# Patient Record
Sex: FEMALE | Race: WHITE | URBAN - METROPOLITAN AREA
[De-identification: names, ages, dates, MRNs, and addresses within clinical notes are randomized per-mention and may not be internally consistent; named-entity substitution may affect disease eponyms.]

---

## 2020-08-04 ENCOUNTER — OFFICE VISIT (OUTPATIENT)
Dept: URBAN - METROPOLITAN AREA CLINIC 115 | Facility: CLINIC | Age: 80
End: 2020-08-04
Payer: MEDICARE

## 2020-08-04 DIAGNOSIS — K63.5 COLON POLYPS: ICD-10-CM

## 2020-08-04 DIAGNOSIS — K21.9 GERD: ICD-10-CM

## 2020-08-04 DIAGNOSIS — D86.0 SARCOIDOSIS OF LUNG: ICD-10-CM

## 2020-08-04 DIAGNOSIS — K58.9 IBS (IRRITABLE BOWEL SYNDROME): ICD-10-CM

## 2020-08-04 DIAGNOSIS — J45.909 ASTHMA: ICD-10-CM

## 2020-08-04 PROBLEM — 24369008 SARCOIDOSIS OF LUNG: Status: ACTIVE | Noted: 2020-08-04

## 2020-08-04 PROCEDURE — G8420 CALC BMI NORM PARAMETERS: HCPCS | Performed by: INTERNAL MEDICINE

## 2020-08-04 PROCEDURE — 99213 OFFICE O/P EST LOW 20 MIN: CPT | Performed by: INTERNAL MEDICINE

## 2020-08-04 PROCEDURE — 1036F TOBACCO NON-USER: CPT | Performed by: INTERNAL MEDICINE

## 2020-08-04 PROCEDURE — G8427 DOCREV CUR MEDS BY ELIG CLIN: HCPCS | Performed by: INTERNAL MEDICINE

## 2020-08-04 RX ORDER — KRILL/OM-3/DHA/EPA/PHOSPHO/AST 500MG-86MG
CAPSULE ORAL
Qty: 0 | Refills: 0 | Status: ACTIVE | COMMUNITY
Start: 1900-01-01

## 2020-08-04 RX ORDER — HYOSCYAMINE SULFATE 0.12 MG/1
TAKE 1 TABLET BY ORAL ROUTE 3 TIMES A DAY AS NEEDED FOR 90 DAYS TABLET ORAL
Qty: 270 | Refills: 3 | Status: ACTIVE | COMMUNITY
Start: 2019-08-12 | End: 2020-08-06

## 2020-08-04 RX ORDER — ALBUTEROL SULFATE 90 UG/1
AEROSOL, METERED RESPIRATORY (INHALATION)
Qty: 0 | Refills: 0 | Status: ACTIVE | COMMUNITY
Start: 1900-01-01

## 2020-08-04 RX ORDER — PANTOPRAZOLE SODIUM 20 MG
TAKE ONE TABLET BY MOUTH DAILY FOR 90 DAYS TABLET, DELAYED RELEASE (ENTERIC COATED) ORAL
Qty: 90 | Refills: 3 | Status: ACTIVE | COMMUNITY
Start: 2019-08-12 | End: 2020-08-06

## 2020-08-04 NOTE — HPI-TODAY'S VISIT:
The patient is a 80 year old /White female , who presents for the follow-up evaluation of GERD and IBS.     During the patient's previous visit about a year ago, she was taking Hyoscyamine when she had diarrhea and she took Metronidazole for abdominal pains and diverticulitis,which are still of benefit.   Currently, the patient is doing well. She does not think her reflux is a factor for her shortness of breath, which is liklyl due to sarcoid.. The long term effects of PPI use were discussed. She was advised to try Zantac 150mg at bedtime. She may have trouble with diverticulosis especially if she walks after she eats. Right before a BM she can become severly nauseated, so she takes Zofran which is of benefit.   SUMMARY OF PREVIOUS VISITON 5/02/2016 Last seen in May 2015, 1 year ago, for diverticulitis and started antibiotics at that time. She reported the antibiotics were effective and she had been feeling much better since. She last had abdominal pain 1 month ago. She went on clear liquids and kept her bowels moving which relieved the pain. The pain had not been as severe as it was in May 2015. Too much fruit or heavy lettuce could cause diverticulitis. If she ate salads or spinach, she would see diarrhea for a couple days, but she took Hyoscyamine. BM QD. When she had spells of abdominal pain, she took metronidazole for a few days until she was feeling better, usually 3-4 days. If she ate spicy or eats 3 meals a day, she had reflux. She tried not to eat much in the evening. She ate a light breakfast and lunch. She took Protonix  20mg QD-BID. She occasionally woke up with a foul taste in her mouth. She denied trouble swallowing. Her last bone denisity was in 2015, she denied osteopenia or osteoporosis. Recent B12 okay, chemistries normal, cbc okay. She took Prednisone if she was wheezing. She had not taken Prednisone since November. We discussed complications of long term PPI use, and emphasized the cumulative effects of prednisonand PPIs on bone density.  4/12/18- CBC normal, CMP normal. 5/8/17- Rapid Hep C results negative    On 8/6/18, the patient stated she intermittently had reflux which was relieved with pantoprazole and Zantac. The patient also had a lot of nausea which was relieved with ondansetron. The patient intermittently took Predisone when her wheezing began because she believed it was causing her nausea. The patient also stated that after taking Prednisone, her esophagus felt irritated. She denied having trouble swallowing. She was taking a vitamin D supplemants for vit. D deficiency. She denied having taken any probiotics.  The patient stated she occasionally experienced diarrhea. She stated she had intermittent hypogastric abdominal pain. The pain was relieved after having a BM. She stated having a BM about every 3 days.    Today, the patient notes she continues to take Align QD which has been of great benefit. She is doing well with her Sarcoidosis. She takes Prednisone for a week when needed and if it worsens she goes to her pulmonologist.    She notes it feels like food sits in her epigastric area. Recently thought food might be getting stuck after she started prednisons so advised to take prevacid and pantoprazole daily as she has in the past.   Her last colonoscopy was 10 years ago so will need to schedule a screening colonoscopy. Time spent with patient: 13 min  Today, 8/4/20, patient reports she continues to take Align and is working well. She typically has a BM QD. Patient takes protonix prn. Occasional levsin but not regularly. Has wheezes off and on. Patient notes 45 years ago she was diagnosed with sarcoidosis and is currently stable. She takes Prednisone prn; she has not taken a dose since January.  Discussed she is not due for a colonoscopy until 2022 due to sessile serrated polyps she had in 2019.   Time spent with patient: 9 min

## 2020-08-04 NOTE — PHYSICAL EXAM HENT:
Head,  normocephalic,  atraumatic,  Face,  Face within normal limits,  Ears,  External ears within normal limits, Patient wearing mask due to COVID-19

## 2022-01-20 ENCOUNTER — TELEPHONE ENCOUNTER (OUTPATIENT)
Dept: URBAN - METROPOLITAN AREA SURGERY CENTER 30 | Facility: SURGERY CENTER | Age: 82
End: 2022-01-20

## 2022-01-20 RX ORDER — HYOSCYAMINE SULFATE 0.12 MG/1
TAKE 1 TABLET BY ORAL ROUTE 3 TIMES A DAY AS NEEDED FOR 90 DAYS TABLET ORAL
Qty: 90 | Refills: 2
Start: 2019-08-12 | End: 2022-04-20

## 2022-01-20 RX ORDER — PANTOPRAZOLE SODIUM 20 MG
TAKE ONE TABLET BY MOUTH DAILY FOR 90 DAYS TABLET, DELAYED RELEASE (ENTERIC COATED) ORAL ONCE A DAY
Qty: 30 | Refills: 2
Start: 2019-08-12

## 2022-04-14 ENCOUNTER — WEB ENCOUNTER (OUTPATIENT)
Dept: URBAN - METROPOLITAN AREA CLINIC 115 | Facility: CLINIC | Age: 82
End: 2022-04-14

## 2022-04-18 ENCOUNTER — LAB OUTSIDE AN ENCOUNTER (OUTPATIENT)
Dept: URBAN - METROPOLITAN AREA CLINIC 115 | Facility: CLINIC | Age: 82
End: 2022-04-18

## 2022-04-18 ENCOUNTER — OFFICE VISIT (OUTPATIENT)
Dept: URBAN - METROPOLITAN AREA CLINIC 115 | Facility: CLINIC | Age: 82
End: 2022-04-18
Payer: MEDICARE

## 2022-04-18 VITALS
BODY MASS INDEX: 26.13 KG/M2 | TEMPERATURE: 97.7 F | DIASTOLIC BLOOD PRESSURE: 71 MMHG | HEIGHT: 62 IN | SYSTOLIC BLOOD PRESSURE: 124 MMHG | WEIGHT: 142 LBS | HEART RATE: 79 BPM

## 2022-04-18 DIAGNOSIS — Z86.010 HISTORY OF COLON POLYPS: ICD-10-CM

## 2022-04-18 DIAGNOSIS — K58.9 IBS (IRRITABLE BOWEL SYNDROME): ICD-10-CM

## 2022-04-18 DIAGNOSIS — K21.9 GERD: ICD-10-CM

## 2022-04-18 DIAGNOSIS — R13.19 INTERMITTENT DYSPHAGIA: ICD-10-CM

## 2022-04-18 PROCEDURE — 99213 OFFICE O/P EST LOW 20 MIN: CPT | Performed by: INTERNAL MEDICINE

## 2022-04-18 RX ORDER — PANTOPRAZOLE SODIUM 20 MG
1 TABLET TABLET, DELAYED RELEASE (ENTERIC COATED) ORAL ONCE A DAY
Qty: 90 TABLET | Refills: 3

## 2022-04-18 RX ORDER — ALBUTEROL SULFATE 90 UG/1
AEROSOL, METERED RESPIRATORY (INHALATION)
Qty: 0 | Refills: 0 | Status: ACTIVE | COMMUNITY
Start: 1900-01-01

## 2022-04-18 RX ORDER — PANTOPRAZOLE SODIUM 20 MG
TAKE ONE TABLET BY MOUTH DAILY FOR 90 DAYS TABLET, DELAYED RELEASE (ENTERIC COATED) ORAL ONCE A DAY
Qty: 30 | Refills: 2 | Status: ACTIVE | COMMUNITY
Start: 2019-08-12

## 2022-04-18 RX ORDER — KRILL/OM-3/DHA/EPA/PHOSPHO/AST 500MG-86MG
CAPSULE ORAL
Qty: 0 | Refills: 0 | Status: ACTIVE | COMMUNITY
Start: 1900-01-01

## 2022-04-18 RX ORDER — ONDANSETRON HYDROCHLORIDE 4 MG/1
1 TABLET TABLET, FILM COATED ORAL TWICE A DAY
Qty: 180 TABLET | Refills: 3 | OUTPATIENT

## 2022-04-18 RX ORDER — CIPROFLOXACIN 500 MG/5ML
2.5 ML KIT ORAL
Status: ACTIVE | COMMUNITY

## 2022-04-18 RX ORDER — ALBUTEROL SULFATE 2.5 MG/3ML
3 ML AS NEEDED SOLUTION RESPIRATORY (INHALATION)
Status: ACTIVE | COMMUNITY

## 2022-04-18 RX ORDER — HYOSCYAMINE SULFATE 0.12 MG/1
TAKE 1 TABLET BY ORAL ROUTE 3 TIMES A DAY AS NEEDED FOR 90 DAYS TABLET ORAL THREE TIMES A DAY
Qty: 270 | Refills: 3

## 2022-04-18 RX ORDER — HYOSCYAMINE SULFATE 0.12 MG/1
TAKE 1 TABLET BY ORAL ROUTE 3 TIMES A DAY AS NEEDED FOR 90 DAYS TABLET ORAL
Qty: 90 | Refills: 2 | Status: ACTIVE | COMMUNITY
Start: 2019-08-12 | End: 2022-04-20

## 2022-04-18 NOTE — HPI-TODAY'S VISIT:
80 y/o white female presents to discuss colonoscopy for hx of polyps. Last colonoscopy 2019, had 15mm sessile serrated adenoma, 3 yr recall recommended.  Hx also notable for IBD-D, stable on probiotics with BM every other day.  Uses hyoscyamine as needed.  Also has intermittent dysphagia.   Takes PPI intermittently, when has heartburn.  Will take protonix for a few days then switch to prevacid then stop.  Uses ondansetron intermittently for nausea, usually occurs when having BM.

## 2022-06-10 PROBLEM — 428283002: Status: ACTIVE | Noted: 2022-04-18

## 2022-07-06 ENCOUNTER — TELEPHONE ENCOUNTER (OUTPATIENT)
Dept: URBAN - METROPOLITAN AREA CLINIC 115 | Facility: CLINIC | Age: 82
End: 2022-07-06

## 2022-07-06 ENCOUNTER — WEB ENCOUNTER (OUTPATIENT)
Dept: URBAN - METROPOLITAN AREA SURGERY CENTER 13 | Facility: SURGERY CENTER | Age: 82
End: 2022-07-06

## 2022-07-06 RX ORDER — ONDANSETRON HYDROCHLORIDE 4 MG/1
1 TABLET TABLET, FILM COATED ORAL TWICE A DAY
Qty: 180 TABLET | Refills: 3

## 2022-07-12 ENCOUNTER — OFFICE VISIT (OUTPATIENT)
Dept: URBAN - METROPOLITAN AREA SURGERY CENTER 13 | Facility: SURGERY CENTER | Age: 82
End: 2022-07-12
Payer: MEDICARE

## 2022-07-12 ENCOUNTER — CLAIMS CREATED FROM THE CLAIM WINDOW (OUTPATIENT)
Dept: URBAN - METROPOLITAN AREA CLINIC 4 | Facility: CLINIC | Age: 82
End: 2022-07-12
Payer: MEDICARE

## 2022-07-12 DIAGNOSIS — K31.89 ACQUIRED DEFORMITY OF DUODENUM: ICD-10-CM

## 2022-07-12 DIAGNOSIS — K31.89 OTHER DISEASES OF STOMACH AND DUODENUM: ICD-10-CM

## 2022-07-12 DIAGNOSIS — R13.19 CERVICAL DYSPHAGIA: ICD-10-CM

## 2022-07-12 DIAGNOSIS — Z86.010 ADENOMAS PERSONAL HISTORY OF COLONIC POLYPS: ICD-10-CM

## 2022-07-12 PROCEDURE — 88305 TISSUE EXAM BY PATHOLOGIST: CPT | Performed by: PATHOLOGY

## 2022-07-12 PROCEDURE — G0105 COLORECTAL SCRN; HI RISK IND: HCPCS | Performed by: INTERNAL MEDICINE

## 2022-07-12 PROCEDURE — G8907 PT DOC NO EVENTS ON DISCHARG: HCPCS | Performed by: INTERNAL MEDICINE

## 2022-07-12 PROCEDURE — 88342 IMHCHEM/IMCYTCHM 1ST ANTB: CPT | Performed by: PATHOLOGY

## 2022-07-12 PROCEDURE — 43239 EGD BIOPSY SINGLE/MULTIPLE: CPT | Performed by: INTERNAL MEDICINE

## 2022-07-12 RX ORDER — HYOSCYAMINE SULFATE 0.12 MG/1
TAKE 1 TABLET BY ORAL ROUTE 3 TIMES A DAY AS NEEDED FOR 90 DAYS TABLET ORAL THREE TIMES A DAY
Qty: 270 | Refills: 3 | Status: ACTIVE | COMMUNITY

## 2022-07-12 RX ORDER — ONDANSETRON HYDROCHLORIDE 4 MG/1
1 TABLET TABLET, FILM COATED ORAL TWICE A DAY
Qty: 180 TABLET | Refills: 3 | Status: ACTIVE | COMMUNITY

## 2022-07-12 RX ORDER — ALBUTEROL SULFATE 2.5 MG/3ML
3 ML AS NEEDED SOLUTION RESPIRATORY (INHALATION)
Status: ACTIVE | COMMUNITY

## 2022-07-12 RX ORDER — PANTOPRAZOLE SODIUM 20 MG
1 TABLET TABLET, DELAYED RELEASE (ENTERIC COATED) ORAL ONCE A DAY
Qty: 90 TABLET | Refills: 3 | Status: ACTIVE | COMMUNITY

## 2022-07-12 RX ORDER — KRILL/OM-3/DHA/EPA/PHOSPHO/AST 500MG-86MG
CAPSULE ORAL
Qty: 0 | Refills: 0 | Status: ACTIVE | COMMUNITY
Start: 1900-01-01

## 2022-07-12 RX ORDER — ALBUTEROL SULFATE 90 UG/1
AEROSOL, METERED RESPIRATORY (INHALATION)
Qty: 0 | Refills: 0 | Status: ACTIVE | COMMUNITY
Start: 1900-01-01

## 2022-07-12 RX ORDER — CIPROFLOXACIN 500 MG/5ML
2.5 ML KIT ORAL
Status: ACTIVE | COMMUNITY

## 2022-09-10 ENCOUNTER — WEB ENCOUNTER (OUTPATIENT)
Dept: URBAN - METROPOLITAN AREA CLINIC 115 | Facility: CLINIC | Age: 82
End: 2022-09-10

## 2022-09-12 ENCOUNTER — OFFICE VISIT (OUTPATIENT)
Dept: URBAN - METROPOLITAN AREA CLINIC 115 | Facility: CLINIC | Age: 82
End: 2022-09-12
Payer: MEDICARE

## 2022-09-12 ENCOUNTER — LAB OUTSIDE AN ENCOUNTER (OUTPATIENT)
Dept: URBAN - METROPOLITAN AREA CLINIC 115 | Facility: CLINIC | Age: 82
End: 2022-09-12

## 2022-09-12 VITALS
HEIGHT: 62 IN | TEMPERATURE: 97.7 F | WEIGHT: 135.2 LBS | HEART RATE: 68 BPM | SYSTOLIC BLOOD PRESSURE: 159 MMHG | DIASTOLIC BLOOD PRESSURE: 78 MMHG | BODY MASS INDEX: 24.88 KG/M2

## 2022-09-12 DIAGNOSIS — R13.19 INTERMITTENT DYSPHAGIA: ICD-10-CM

## 2022-09-12 DIAGNOSIS — K29.30 CHRONIC SUPERFICIAL GASTRITIS WITHOUT BLEEDING: ICD-10-CM

## 2022-09-12 PROCEDURE — 99214 OFFICE O/P EST MOD 30 MIN: CPT | Performed by: INTERNAL MEDICINE

## 2022-09-12 RX ORDER — KRILL/OM-3/DHA/EPA/PHOSPHO/AST 500MG-86MG
CAPSULE ORAL
Qty: 0 | Refills: 0 | Status: ACTIVE | COMMUNITY
Start: 1900-01-01

## 2022-09-12 RX ORDER — PANTOPRAZOLE SODIUM 20 MG
1 TABLET TABLET, DELAYED RELEASE (ENTERIC COATED) ORAL ONCE A DAY
Qty: 90 TABLET | Refills: 3 | Status: ACTIVE | COMMUNITY

## 2022-09-12 RX ORDER — PANTOPRAZOLE SODIUM 20 MG/1
1 TABLET TABLET, DELAYED RELEASE ORAL ONCE A DAY
Status: ACTIVE | COMMUNITY

## 2022-09-12 RX ORDER — HYOSCYAMINE SULFATE 0.38 MG/1
1 TABLET TABLET, EXTENDED RELEASE ORAL
Status: ACTIVE | COMMUNITY

## 2022-09-12 RX ORDER — ALBUTEROL SULFATE 90 UG/1
AEROSOL, METERED RESPIRATORY (INHALATION)
Qty: 0 | Refills: 0 | Status: ACTIVE | COMMUNITY
Start: 1900-01-01

## 2022-09-12 RX ORDER — LANSOPRAZOLE 30 MG/1
1 CAPSULE BEFORE A MEAL CAPSULE, DELAYED RELEASE ORAL ONCE A DAY
Status: ON HOLD | COMMUNITY

## 2022-09-12 RX ORDER — CIPROFLOXACIN 500 MG/5ML
2.5 ML KIT ORAL
Status: ACTIVE | COMMUNITY

## 2022-09-12 RX ORDER — ONDANSETRON HYDROCHLORIDE 4 MG/1
1 TABLET TABLET, FILM COATED ORAL TWICE A DAY
Qty: 180 TABLET | Refills: 3 | Status: ACTIVE | COMMUNITY

## 2022-09-12 RX ORDER — GABAPENTIN 100 MG/1
1 CAPSULE CAPSULE ORAL ONCE A DAY
Status: ACTIVE | COMMUNITY

## 2022-09-12 RX ORDER — FAMOTIDINE 20 MG/1
1 TABLET AT BEDTIME AS NEEDED TABLET, FILM COATED ORAL TWICE A DAY
Qty: 60 TABLET | Refills: 3 | OUTPATIENT

## 2022-09-12 NOTE — HPI-TODAY'S VISIT:
81 y/o white female presents in follow up after EGD/colonoscopy .  STill having intermittent dysphagia.   On pantoprazole daily now.  EGD found moderate gastritis.  Bxp are negative for H.Pylori. She denies NSAIDs.  Says not taking diclofenac.

## 2022-09-21 ENCOUNTER — TELEPHONE ENCOUNTER (OUTPATIENT)
Dept: URBAN - METROPOLITAN AREA CLINIC 82 | Facility: CLINIC | Age: 82
End: 2022-09-21

## 2022-09-21 ENCOUNTER — LAB OUTSIDE AN ENCOUNTER (OUTPATIENT)
Dept: URBAN - METROPOLITAN AREA CLINIC 82 | Facility: CLINIC | Age: 82
End: 2022-09-21

## 2022-10-05 ENCOUNTER — OFFICE VISIT (OUTPATIENT)
Dept: URBAN - METROPOLITAN AREA CLINIC 114 | Facility: CLINIC | Age: 82
End: 2022-10-05
Payer: MEDICARE

## 2022-10-05 DIAGNOSIS — K29.30 CHRONIC SUPERFICIAL GASTRITIS WITHOUT BLEEDING: ICD-10-CM

## 2022-10-05 PROCEDURE — 83013 H PYLORI (C-13) BREATH: CPT | Performed by: INTERNAL MEDICINE

## 2022-10-05 PROCEDURE — 83014 H PYLORI DRUG ADMIN: CPT | Performed by: INTERNAL MEDICINE

## 2022-10-10 ENCOUNTER — LAB OUTSIDE AN ENCOUNTER (OUTPATIENT)
Dept: URBAN - METROPOLITAN AREA CLINIC 115 | Facility: CLINIC | Age: 82
End: 2022-10-10

## 2022-10-14 ENCOUNTER — TELEPHONE ENCOUNTER (OUTPATIENT)
Dept: URBAN - METROPOLITAN AREA CLINIC 115 | Facility: CLINIC | Age: 82
End: 2022-10-14

## 2022-10-19 PROBLEM — 19597002 INTERMITTENT DYSPHAGIA: Status: ACTIVE | Noted: 2022-10-19

## 2022-10-24 PROBLEM — 196735001: Status: ACTIVE | Noted: 2022-09-12

## 2022-11-10 ENCOUNTER — OFFICE VISIT (OUTPATIENT)
Dept: URBAN - METROPOLITAN AREA SURGERY CENTER 13 | Facility: SURGERY CENTER | Age: 82
End: 2022-11-10
Payer: MEDICARE

## 2022-11-10 DIAGNOSIS — R13.19 CERVICAL DYSPHAGIA: ICD-10-CM

## 2022-11-10 DIAGNOSIS — K22.89 DILATATION OF ESOPHAGUS: ICD-10-CM

## 2022-11-10 DIAGNOSIS — K31.89 ACQUIRED DEFORMITY OF DUODENUM: ICD-10-CM

## 2022-11-10 DIAGNOSIS — R93.3 ABN FINDINGS-GI TRACT: ICD-10-CM

## 2022-11-10 PROCEDURE — 43248 EGD GUIDE WIRE INSERTION: CPT | Performed by: INTERNAL MEDICINE

## 2022-11-10 PROCEDURE — G8907 PT DOC NO EVENTS ON DISCHARG: HCPCS | Performed by: INTERNAL MEDICINE

## 2022-11-10 RX ORDER — PANTOPRAZOLE SODIUM 20 MG
1 TABLET TABLET, DELAYED RELEASE (ENTERIC COATED) ORAL ONCE A DAY
Qty: 90 TABLET | Refills: 3 | Status: ACTIVE | COMMUNITY

## 2022-11-10 RX ORDER — LANSOPRAZOLE 30 MG/1
1 CAPSULE BEFORE A MEAL CAPSULE, DELAYED RELEASE ORAL ONCE A DAY
Status: ON HOLD | COMMUNITY

## 2022-11-10 RX ORDER — ALBUTEROL SULFATE 90 UG/1
AEROSOL, METERED RESPIRATORY (INHALATION)
Qty: 0 | Refills: 0 | Status: ACTIVE | COMMUNITY
Start: 1900-01-01

## 2022-11-10 RX ORDER — ONDANSETRON HYDROCHLORIDE 4 MG/1
1 TABLET TABLET, FILM COATED ORAL TWICE A DAY
Qty: 180 TABLET | Refills: 3 | Status: ACTIVE | COMMUNITY

## 2022-11-10 RX ORDER — FAMOTIDINE 20 MG/1
1 TABLET AT BEDTIME AS NEEDED TABLET, FILM COATED ORAL TWICE A DAY
Qty: 60 TABLET | Refills: 3 | Status: ACTIVE | COMMUNITY

## 2022-11-10 RX ORDER — CIPROFLOXACIN 500 MG/5ML
2.5 ML KIT ORAL
Status: ACTIVE | COMMUNITY

## 2022-11-10 RX ORDER — HYOSCYAMINE SULFATE 0.38 MG/1
1 TABLET TABLET, EXTENDED RELEASE ORAL
Status: ACTIVE | COMMUNITY

## 2022-11-10 RX ORDER — KRILL/OM-3/DHA/EPA/PHOSPHO/AST 500MG-86MG
CAPSULE ORAL
Qty: 0 | Refills: 0 | Status: ACTIVE | COMMUNITY
Start: 1900-01-01

## 2022-11-10 RX ORDER — GABAPENTIN 100 MG/1
1 CAPSULE CAPSULE ORAL ONCE A DAY
Status: ACTIVE | COMMUNITY

## 2022-11-10 RX ORDER — PANTOPRAZOLE SODIUM 20 MG/1
1 TABLET TABLET, DELAYED RELEASE ORAL ONCE A DAY
Status: ACTIVE | COMMUNITY

## 2022-11-14 ENCOUNTER — TELEPHONE ENCOUNTER (OUTPATIENT)
Dept: URBAN - METROPOLITAN AREA CLINIC 115 | Facility: CLINIC | Age: 82
End: 2022-11-14

## 2022-11-14 ENCOUNTER — OFFICE VISIT (OUTPATIENT)
Dept: URBAN - METROPOLITAN AREA CLINIC 115 | Facility: CLINIC | Age: 82
End: 2022-11-14

## 2022-11-14 RX ORDER — PANTOPRAZOLE SODIUM 20 MG/1
1 TABLET TABLET, DELAYED RELEASE ORAL ONCE A DAY
Qty: 90 | Refills: 3

## 2023-03-03 ENCOUNTER — TELEPHONE ENCOUNTER (OUTPATIENT)
Dept: URBAN - METROPOLITAN AREA CLINIC 115 | Facility: CLINIC | Age: 83
End: 2023-03-03

## 2023-03-03 RX ORDER — PANTOPRAZOLE SODIUM 20 MG/1
1 TABLET TABLET, DELAYED RELEASE ORAL ONCE A DAY
Qty: 30 | Refills: 0

## 2023-03-27 ENCOUNTER — OFFICE VISIT (OUTPATIENT)
Dept: URBAN - METROPOLITAN AREA CLINIC 115 | Facility: CLINIC | Age: 83
End: 2023-03-27

## 2023-04-05 ENCOUNTER — LAB OUTSIDE AN ENCOUNTER (OUTPATIENT)
Dept: URBAN - METROPOLITAN AREA CLINIC 115 | Facility: CLINIC | Age: 83
End: 2023-04-05

## 2023-04-05 ENCOUNTER — WEB ENCOUNTER (OUTPATIENT)
Dept: URBAN - METROPOLITAN AREA CLINIC 115 | Facility: CLINIC | Age: 83
End: 2023-04-05

## 2023-04-05 ENCOUNTER — OFFICE VISIT (OUTPATIENT)
Dept: URBAN - METROPOLITAN AREA CLINIC 115 | Facility: CLINIC | Age: 83
End: 2023-04-05
Payer: MEDICARE

## 2023-04-05 VITALS
WEIGHT: 123.2 LBS | BODY MASS INDEX: 22.67 KG/M2 | SYSTOLIC BLOOD PRESSURE: 121 MMHG | DIASTOLIC BLOOD PRESSURE: 72 MMHG | HEIGHT: 62 IN | HEART RATE: 73 BPM | TEMPERATURE: 98.4 F

## 2023-04-05 DIAGNOSIS — R63.4 ABNORMAL WEIGHT LOSS: ICD-10-CM

## 2023-04-05 DIAGNOSIS — K59.01 SLOW TRANSIT CONSTIPATION: ICD-10-CM

## 2023-04-05 DIAGNOSIS — R13.19 INTERMITTENT DYSPHAGIA: ICD-10-CM

## 2023-04-05 DIAGNOSIS — K58.9 IBS (IRRITABLE BOWEL SYNDROME): ICD-10-CM

## 2023-04-05 DIAGNOSIS — K21.9 GERD: ICD-10-CM

## 2023-04-05 PROBLEM — 35298007: Status: ACTIVE | Noted: 2023-04-05

## 2023-04-05 PROCEDURE — 99214 OFFICE O/P EST MOD 30 MIN: CPT | Performed by: INTERNAL MEDICINE

## 2023-04-05 RX ORDER — FAMOTIDINE 20 MG/1
1 TABLET AT BEDTIME AS NEEDED TABLET, FILM COATED ORAL TWICE A DAY
Qty: 60 TABLET | Refills: 3

## 2023-04-05 RX ORDER — FAMOTIDINE 20 MG/1
1 TABLET AT BEDTIME AS NEEDED TABLET, FILM COATED ORAL TWICE A DAY
Qty: 60 TABLET | Refills: 3 | Status: ACTIVE | COMMUNITY

## 2023-04-05 RX ORDER — ONDANSETRON HYDROCHLORIDE 4 MG/1
1 TABLET TABLET, FILM COATED ORAL TWICE A DAY
Qty: 180 TABLET | Refills: 3 | Status: ACTIVE | COMMUNITY

## 2023-04-05 RX ORDER — LANSOPRAZOLE 30 MG/1
1 CAPSULE BEFORE A MEAL CAPSULE, DELAYED RELEASE ORAL ONCE A DAY
Status: ON HOLD | COMMUNITY

## 2023-04-05 RX ORDER — ALBUTEROL SULFATE 90 UG/1
AEROSOL, METERED RESPIRATORY (INHALATION)
Qty: 0 | Refills: 0 | Status: ACTIVE | COMMUNITY
Start: 1900-01-01

## 2023-04-05 RX ORDER — CIPROFLOXACIN 500 MG/5ML
2.5 ML KIT ORAL
Status: ACTIVE | COMMUNITY

## 2023-04-05 RX ORDER — KRILL/OM-3/DHA/EPA/PHOSPHO/AST 500MG-86MG
CAPSULE ORAL
Qty: 0 | Refills: 0 | Status: ACTIVE | COMMUNITY
Start: 1900-01-01

## 2023-04-05 RX ORDER — PANTOPRAZOLE SODIUM 20 MG
1 TABLET TABLET, DELAYED RELEASE (ENTERIC COATED) ORAL ONCE A DAY
Qty: 90 TABLET | Refills: 3 | Status: ACTIVE | COMMUNITY

## 2023-04-05 RX ORDER — PANTOPRAZOLE SODIUM 20 MG
1 TABLET TABLET, DELAYED RELEASE (ENTERIC COATED) ORAL ONCE A DAY
Qty: 90 TABLET | Refills: 3

## 2023-04-05 RX ORDER — GABAPENTIN 100 MG/1
1 CAPSULE CAPSULE ORAL ONCE A DAY
Status: ACTIVE | COMMUNITY

## 2023-04-05 RX ORDER — HYOSCYAMINE SULFATE 0.38 MG/1
1 TABLET TABLET, EXTENDED RELEASE ORAL
Status: ACTIVE | COMMUNITY

## 2023-04-05 RX ORDER — PANTOPRAZOLE SODIUM 20 MG/1
1 TABLET TABLET, DELAYED RELEASE ORAL ONCE A DAY
Qty: 30 | Refills: 0 | Status: ACTIVE | COMMUNITY

## 2023-04-05 NOTE — HPI-TODAY'S VISIT:
83 y/o white female presents in follow up.  BArium swallow 2022 had found smooth tapering with barium tablet getting lodge. EGD w/dilation 2022 to 56 [place].  Mild erythema present. Bxp from prior EGD were negative for H.Pylori. Colonoscopy 2022.  On pantoprazole daily,  She denies NSAIDs.  Says not taking diclofenac.  Dysphagia has resolved.  She also brings up weight loss, 20 pounds in past 1 year.  She is worried because her hsuband  of pancreas cancer.

## 2023-04-20 ENCOUNTER — LAB OUTSIDE AN ENCOUNTER (OUTPATIENT)
Dept: URBAN - METROPOLITAN AREA CLINIC 115 | Facility: CLINIC | Age: 83
End: 2023-04-20

## 2023-04-20 LAB
CREATININE POC: 0.9
PERFORMING LAB: (no result)

## 2023-04-25 ENCOUNTER — TELEPHONE ENCOUNTER (OUTPATIENT)
Dept: URBAN - METROPOLITAN AREA CLINIC 82 | Facility: CLINIC | Age: 83
End: 2023-04-25

## 2023-05-04 ENCOUNTER — TELEPHONE ENCOUNTER (OUTPATIENT)
Dept: URBAN - METROPOLITAN AREA CLINIC 115 | Facility: CLINIC | Age: 83
End: 2023-05-04

## 2024-01-08 ENCOUNTER — DASHBOARD ENCOUNTERS (OUTPATIENT)
Age: 84
End: 2024-01-08

## 2024-01-08 ENCOUNTER — LAB OUTSIDE AN ENCOUNTER (OUTPATIENT)
Dept: URBAN - METROPOLITAN AREA CLINIC 115 | Facility: CLINIC | Age: 84
End: 2024-01-08

## 2024-01-08 ENCOUNTER — OFFICE VISIT (OUTPATIENT)
Dept: URBAN - METROPOLITAN AREA CLINIC 115 | Facility: CLINIC | Age: 84
End: 2024-01-08
Payer: MEDICARE

## 2024-01-08 VITALS
DIASTOLIC BLOOD PRESSURE: 77 MMHG | HEART RATE: 84 BPM | TEMPERATURE: 97.7 F | WEIGHT: 118.6 LBS | SYSTOLIC BLOOD PRESSURE: 149 MMHG | BODY MASS INDEX: 21.83 KG/M2 | HEIGHT: 62 IN

## 2024-01-08 DIAGNOSIS — R19.00 ABDOMINAL WALL BULGE: ICD-10-CM

## 2024-01-08 DIAGNOSIS — K40.90 UNILATERAL INGUINAL HERNIA WITHOUT OBSTRUCTION OR GANGRENE, RECURRENCE NOT SPECIFIED: ICD-10-CM

## 2024-01-08 PROCEDURE — 99214 OFFICE O/P EST MOD 30 MIN: CPT | Performed by: INTERNAL MEDICINE

## 2024-01-08 RX ORDER — HYOSCYAMINE SULFATE 0.38 MG/1
1 TABLET TABLET, EXTENDED RELEASE ORAL
Status: ACTIVE | COMMUNITY

## 2024-01-08 RX ORDER — FAMOTIDINE 20 MG/1
1 TABLET AT BEDTIME AS NEEDED TABLET, FILM COATED ORAL TWICE A DAY
Qty: 60 TABLET | Refills: 3 | Status: ACTIVE | COMMUNITY

## 2024-01-08 RX ORDER — PANTOPRAZOLE SODIUM 20 MG
1 TABLET TABLET, DELAYED RELEASE (ENTERIC COATED) ORAL ONCE A DAY
Qty: 90 TABLET | Refills: 3 | Status: ACTIVE | COMMUNITY

## 2024-01-08 RX ORDER — GABAPENTIN 100 MG/1
1 CAPSULE CAPSULE ORAL ONCE A DAY
Status: ACTIVE | COMMUNITY

## 2024-01-08 RX ORDER — CIPROFLOXACIN 500 MG/5ML
2.5 ML KIT ORAL
Status: ACTIVE | COMMUNITY

## 2024-01-08 RX ORDER — PANTOPRAZOLE SODIUM 20 MG/1
1 TABLET TABLET, DELAYED RELEASE ORAL ONCE A DAY
Qty: 30 | Refills: 0 | Status: ACTIVE | COMMUNITY

## 2024-01-08 RX ORDER — ALBUTEROL SULFATE 90 UG/1
AEROSOL, METERED RESPIRATORY (INHALATION)
Qty: 0 | Refills: 0 | Status: ACTIVE | COMMUNITY
Start: 1900-01-01

## 2024-01-08 RX ORDER — KRILL/OM-3/DHA/EPA/PHOSPHO/AST 500MG-86MG
CAPSULE ORAL
Qty: 0 | Refills: 0 | Status: ACTIVE | COMMUNITY
Start: 1900-01-01

## 2024-01-08 RX ORDER — ONDANSETRON HYDROCHLORIDE 4 MG/1
1 TABLET TABLET, FILM COATED ORAL TWICE A DAY
Qty: 180 TABLET | Refills: 3 | Status: ACTIVE | COMMUNITY

## 2024-01-08 RX ORDER — LANSOPRAZOLE 30 MG/1
1 CAPSULE BEFORE A MEAL CAPSULE, DELAYED RELEASE ORAL ONCE A DAY
Status: ON HOLD | COMMUNITY

## 2024-01-08 NOTE — HPI-TODAY'S VISIT:
84 y/o female presents for a bulge in her LLQ. Noticed initially 11/2023 when getting out of shower.  It resolved afterwards but then recurred again 12/2023. No pain.  Soft.  Bowels are regular and soft with metamucil and suppositories.  Prior consipation improved.  Had pain last visit but that resolved with improvement in bowel movements.  Will be travling to her [place] home next week, plans to stay for several months.

## 2024-01-08 NOTE — PHYSICAL EXAM GASTROINTESTINAL
Abdomen , soft, nontender, nondistended , no guarding or rigidity , palpable soft tissue prominence in LLQ above left inguinal ligament - reducible and non-tender, normal bowel sounds , Liver and Spleen , no hepatosplenomegaly No pertinent family history in first degree relatives

## 2024-01-10 ENCOUNTER — TELEPHONE ENCOUNTER (OUTPATIENT)
Dept: URBAN - METROPOLITAN AREA CLINIC 115 | Facility: CLINIC | Age: 84
End: 2024-01-10

## 2024-05-01 ENCOUNTER — WEB ENCOUNTER (OUTPATIENT)
Dept: URBAN - METROPOLITAN AREA CLINIC 115 | Facility: CLINIC | Age: 84
End: 2024-05-01

## 2024-05-01 RX ORDER — FAMOTIDINE 20 MG/1
1 TABLET TABLET, FILM COATED ORAL TWICE A DAY
Qty: 180 TABLET | Refills: 3

## 2024-08-21 ENCOUNTER — WEB ENCOUNTER (OUTPATIENT)
Dept: URBAN - METROPOLITAN AREA CLINIC 115 | Facility: CLINIC | Age: 84
End: 2024-08-21

## 2024-08-21 RX ORDER — HYOSCYAMINE SULFATE 0.38 MG/1
1 TABLET TABLET, EXTENDED RELEASE ORAL
Qty: 180 TABLET | Refills: 3

## 2024-12-09 ENCOUNTER — OFFICE VISIT (OUTPATIENT)
Dept: URBAN - METROPOLITAN AREA CLINIC 115 | Facility: CLINIC | Age: 84
End: 2024-12-09
Payer: COMMERCIAL

## 2024-12-09 VITALS
BODY MASS INDEX: 21.9 KG/M2 | WEIGHT: 119 LBS | HEART RATE: 87 BPM | SYSTOLIC BLOOD PRESSURE: 145 MMHG | HEIGHT: 62 IN | TEMPERATURE: 97.8 F | DIASTOLIC BLOOD PRESSURE: 64 MMHG

## 2024-12-09 DIAGNOSIS — K58.1 IRRITABLE BOWEL SYNDROME WITH CONSTIPATION: ICD-10-CM

## 2024-12-09 DIAGNOSIS — K40.90 UNILATERAL INGUINAL HERNIA WITHOUT OBSTRUCTION OR GANGRENE, RECURRENCE NOT SPECIFIED: ICD-10-CM

## 2024-12-09 DIAGNOSIS — K21.9 GASTROESOPHAGEAL REFLUX DISEASE WITHOUT ESOPHAGITIS: ICD-10-CM

## 2024-12-09 PROBLEM — 440630006: Status: ACTIVE | Noted: 2024-12-09

## 2024-12-09 PROBLEM — 266435005: Status: ACTIVE | Noted: 2024-12-09

## 2024-12-09 PROCEDURE — 99213 OFFICE O/P EST LOW 20 MIN: CPT | Performed by: INTERNAL MEDICINE

## 2024-12-09 RX ORDER — PANTOPRAZOLE 20 MG/1
1 TABLET TABLET, DELAYED RELEASE ORAL ONCE A DAY
Qty: 90 TABLET | Refills: 3

## 2024-12-09 RX ORDER — HYOSCYAMINE SULFATE 0.38 MG/1
1 TABLET TABLET, EXTENDED RELEASE ORAL
Qty: 180 TABLET | Refills: 3 | Status: ACTIVE | COMMUNITY

## 2024-12-09 RX ORDER — GABAPENTIN 100 MG/1
1 CAPSULE CAPSULE ORAL ONCE A DAY
Status: ACTIVE | COMMUNITY

## 2024-12-09 RX ORDER — KRILL/OM-3/DHA/EPA/PHOSPHO/AST 500MG-86MG
CAPSULE ORAL
Qty: 0 | Refills: 0 | Status: ACTIVE | COMMUNITY
Start: 1900-01-01

## 2024-12-09 RX ORDER — PANTOPRAZOLE 20 MG/1
TAKE ONE TABLET BY MOUTH DAILY TABLET, DELAYED RELEASE ORAL
Qty: 90 TABLET | Refills: 3 | Status: ACTIVE | COMMUNITY

## 2024-12-09 RX ORDER — FAMOTIDINE 20 MG/1
1 TABLET TABLET, FILM COATED ORAL TWICE A DAY
Qty: 180 TABLET | Refills: 3 | Status: ACTIVE | COMMUNITY

## 2024-12-09 RX ORDER — ALBUTEROL SULFATE 90 UG/1
AEROSOL, METERED RESPIRATORY (INHALATION)
Qty: 0 | Refills: 0 | Status: ACTIVE | COMMUNITY
Start: 1900-01-01

## 2024-12-09 RX ORDER — CIPROFLOXACIN 500 MG/5ML
2.5 ML KIT ORAL
Status: ACTIVE | COMMUNITY

## 2024-12-09 RX ORDER — FAMOTIDINE 20 MG/1
1 TABLET TABLET, FILM COATED ORAL TWICE A DAY
Qty: 180 TABLET | Refills: 3

## 2024-12-09 RX ORDER — ONDANSETRON HYDROCHLORIDE 4 MG/1
1 TABLET TABLET, FILM COATED ORAL TWICE A DAY
Qty: 180 TABLET | Refills: 3 | Status: ACTIVE | COMMUNITY

## 2024-12-09 NOTE — HPI-TODAY'S VISIT:
85 y/o white female presents for follow up of GERD, constipation and inguinal hernia.  She saw Dr. Jorge for left inguinal hernia. It was decided not to repair unless it enlarges or causes pain.  Constipation is well controlled with colace or miralax.  Occassionally may need suppository. Hyocasomine occassionally for abdominal cramping.  Pantoprazole and famotidine control GERD symptoms. She requests refills.

## 2025-03-18 ENCOUNTER — ERX REFILL RESPONSE (OUTPATIENT)
Dept: URBAN - METROPOLITAN AREA CLINIC 115 | Facility: CLINIC | Age: 85
End: 2025-03-18

## 2025-03-18 RX ORDER — PANTOPRAZOLE 20 MG/1
1 TABLET TABLET, DELAYED RELEASE ORAL ONCE A DAY
Qty: 90 TABLET | Refills: 3 | OUTPATIENT

## 2025-07-30 ENCOUNTER — OFFICE VISIT (OUTPATIENT)
Dept: URBAN - METROPOLITAN AREA CLINIC 82 | Facility: CLINIC | Age: 85
End: 2025-07-30

## 2025-08-29 ENCOUNTER — TELEPHONE ENCOUNTER (OUTPATIENT)
Dept: URBAN - METROPOLITAN AREA CLINIC 115 | Facility: CLINIC | Age: 85
End: 2025-08-29

## 2025-08-29 ENCOUNTER — OFFICE VISIT (OUTPATIENT)
Dept: URBAN - METROPOLITAN AREA CLINIC 115 | Facility: CLINIC | Age: 85
End: 2025-08-29
Payer: MEDICARE

## 2025-08-29 DIAGNOSIS — K29.70 GASTRITIS, UNSPECIFIED, WITHOUT BLEEDING: ICD-10-CM

## 2025-08-29 DIAGNOSIS — R11.0 NAUSEA: ICD-10-CM

## 2025-08-29 DIAGNOSIS — D64.9 NORMOCYTIC ANEMIA: ICD-10-CM

## 2025-08-29 DIAGNOSIS — R13.19 ESOPHAGEAL DYSPHAGIA: ICD-10-CM

## 2025-08-29 DIAGNOSIS — R42 VERTIGO: ICD-10-CM

## 2025-08-29 PROCEDURE — 99214 OFFICE O/P EST MOD 30 MIN: CPT | Performed by: INTERNAL MEDICINE

## 2025-08-29 RX ORDER — FAMOTIDINE 20 MG/1
1 TABLET TABLET, FILM COATED ORAL TWICE A DAY
Qty: 180 TABLET | Refills: 3 | Status: ACTIVE | COMMUNITY

## 2025-08-29 RX ORDER — HYOSCYAMINE SULFATE 0.38 MG/1
1 TABLET TABLET, EXTENDED RELEASE ORAL
Qty: 180 TABLET | Refills: 3

## 2025-08-29 RX ORDER — KRILL/OM-3/DHA/EPA/PHOSPHO/AST 500MG-86MG
CAPSULE ORAL
Qty: 0 | Refills: 0 | Status: ACTIVE | COMMUNITY
Start: 1900-01-01

## 2025-08-29 RX ORDER — PANTOPRAZOLE 20 MG/1
1 TABLET TABLET, DELAYED RELEASE ORAL ONCE A DAY
Qty: 90 TABLET | Refills: 3

## 2025-08-29 RX ORDER — FAMOTIDINE 20 MG/1
1 TABLET TABLET, FILM COATED ORAL TWICE A DAY
Qty: 180 TABLET | Refills: 3

## 2025-08-29 RX ORDER — HYOSCYAMINE SULFATE 0.38 MG/1
1 TABLET TABLET, EXTENDED RELEASE ORAL
Qty: 180 TABLET | Refills: 3 | Status: ACTIVE | COMMUNITY

## 2025-08-29 RX ORDER — ALBUTEROL SULFATE 90 UG/1
AEROSOL, METERED RESPIRATORY (INHALATION)
Qty: 0 | Refills: 0 | Status: ACTIVE | COMMUNITY
Start: 1900-01-01

## 2025-08-29 RX ORDER — GABAPENTIN 100 MG/1
1 CAPSULE CAPSULE ORAL ONCE A DAY
Status: ACTIVE | COMMUNITY

## 2025-08-29 RX ORDER — PANTOPRAZOLE 20 MG/1
1 TABLET TABLET, DELAYED RELEASE ORAL ONCE A DAY
Qty: 90 TABLET | Refills: 3 | Status: ACTIVE | COMMUNITY

## 2025-08-29 RX ORDER — CIPROFLOXACIN 500 MG/5ML
2.5 ML KIT ORAL
Status: ACTIVE | COMMUNITY

## 2025-08-30 LAB
IRON BIND.CAP.(TIBC): 316
IRON SATURATION: 34
IRON: 109